# Patient Record
Sex: FEMALE | Race: WHITE | NOT HISPANIC OR LATINO | Employment: FULL TIME | RURAL
[De-identification: names, ages, dates, MRNs, and addresses within clinical notes are randomized per-mention and may not be internally consistent; named-entity substitution may affect disease eponyms.]

---

## 2020-06-18 ENCOUNTER — OFFICE VISIT (OUTPATIENT)
Dept: FAMILY MEDICINE CLINIC | Facility: CLINIC | Age: 35
End: 2020-06-18

## 2020-06-18 VITALS
DIASTOLIC BLOOD PRESSURE: 68 MMHG | HEIGHT: 66 IN | RESPIRATION RATE: 15 BRPM | SYSTOLIC BLOOD PRESSURE: 111 MMHG | WEIGHT: 144 LBS | HEART RATE: 67 BPM | BODY MASS INDEX: 23.14 KG/M2 | OXYGEN SATURATION: 99 % | TEMPERATURE: 97.6 F

## 2020-06-18 DIAGNOSIS — Z02.89 ENCOUNTER FOR FEDERAL AVIATION ADMINISTRATION (FAA) EXAMINATION: Primary | ICD-10-CM

## 2020-06-18 PROCEDURE — FAA1PHY: Performed by: FAMILY MEDICINE

## 2020-06-18 NOTE — ASSESSMENT & PLAN NOTE
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  Ua done today was normal. Vision 20/20 -bilaterally.  -- Forms electronically filed to the FAA

## 2020-06-18 NOTE — PROGRESS NOTES
"Subjective   Carin Aquino is a 35 y.o. female.     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    History reviewed. No pertinent family history.    Social History     Tobacco Use   • Smoking status: Never Smoker   Substance Use Topics   • Alcohol use: Not on file   • Drug use: Not on file       History reviewed. No pertinent surgical history.    Patient Active Problem List   Diagnosis   • Encounter for Federal Aviation Administration (FAA) examination       No current outpatient medications on file prior to visit.     No current facility-administered medications on file prior to visit.      Social History     Social History Narrative    Patient was in the [employer] and then started with  [employer] October, 2019.  to Baltazar Aquino, who is also a .  They have a 1 child.         Allergies not on file    Review of Systems   All other systems reviewed and are negative.      Objective   Visit Vitals  /68 (BP Location: Left arm, Patient Position: Sitting, Cuff Size: Adult)   Pulse 67   Temp 97.6 °F (36.4 °C)   Resp 15   Ht 167.6 cm (66\")   Wt 65.3 kg (144 lb)   SpO2 99%   BMI 23.24 kg/m²     Physical Exam   Constitutional: She is oriented to person, place, and time. She appears well-developed and well-nourished. No distress.   HENT:   Head: Normocephalic and atraumatic.   Right Ear: External ear normal.   Left Ear: External ear normal.   Mouth/Throat: Oropharynx is clear and moist. No oropharyngeal exudate.   Eyes: Pupils are equal, round, and reactive to light. Conjunctivae, EOM and lids are normal. Lids are everted and swept, no foreign bodies found. Right eye exhibits " no discharge and no exudate. Left eye exhibits no discharge and no exudate. Right conjunctiva is not injected. Left conjunctiva is not injected.   Fundoscopic exam:       The right eye shows no AV nicking, no exudate, no hemorrhage and no papilledema.        The left eye shows no AV nicking, no exudate, no hemorrhage and no papilledema.   Neck: Normal range of motion. Neck supple. No thyromegaly present.   Cardiovascular: Normal rate, regular rhythm, normal heart sounds and intact distal pulses. Exam reveals no gallop and no friction rub.   No murmur heard.  Pulmonary/Chest: Effort normal and breath sounds normal. No respiratory distress. She has no wheezes. She exhibits no tenderness.   Abdominal: Soft. Bowel sounds are normal. She exhibits no distension. There is no tenderness. There is no rebound and no guarding. No hernia.   Musculoskeletal: Normal range of motion. She exhibits no edema, tenderness or deformity.   Lymphadenopathy:     She has no cervical adenopathy.        Right cervical: No superficial cervical adenopathy present.       Left cervical: No superficial cervical adenopathy present.   Neurological: She is alert and oriented to person, place, and time. She displays normal reflexes. No cranial nerve deficit or sensory deficit. She exhibits normal muscle tone. Coordination normal.   Skin: Skin is warm and dry. No rash noted. She is not diaphoretic. No erythema.   Psychiatric: She has a normal mood and affect. Her behavior is normal. Judgment and thought content normal.         Assessment/Plan .  Problem List Items Addressed This Visit        Unprioritized    Encounter for Federal Aviation Administration (FAA) examination - Primary    Current Assessment & Plan     Patient passed the 1st class flight examination.  Ua done today was normal. Vision 20/20 -bilaterally.  -- Forms electronically filed to the FAA